# Patient Record
Sex: FEMALE | Race: BLACK OR AFRICAN AMERICAN | URBAN - METROPOLITAN AREA
[De-identification: names, ages, dates, MRNs, and addresses within clinical notes are randomized per-mention and may not be internally consistent; named-entity substitution may affect disease eponyms.]

---

## 2023-08-16 ENCOUNTER — OFFICE VISIT (OUTPATIENT)
Age: 53
End: 2023-08-16

## 2023-08-16 VITALS — HEIGHT: 68 IN | BODY MASS INDEX: 32.58 KG/M2 | WEIGHT: 215 LBS

## 2023-08-16 DIAGNOSIS — M77.11 LATERAL EPICONDYLITIS OF RIGHT ELBOW: Primary | ICD-10-CM

## 2023-08-16 RX ORDER — TRIAMCINOLONE ACETONIDE 40 MG/ML
40 INJECTION, SUSPENSION INTRA-ARTICULAR; INTRAMUSCULAR ONCE
Status: COMPLETED | OUTPATIENT
Start: 2023-08-16 | End: 2023-08-16

## 2023-08-16 RX ORDER — FERROUS SULFATE 325(65) MG
325 TABLET ORAL
COMMUNITY

## 2023-08-16 RX ADMIN — TRIAMCINOLONE ACETONIDE 40 MG: 40 INJECTION, SUSPENSION INTRA-ARTICULAR; INTRAMUSCULAR at 11:07

## 2024-05-29 ENCOUNTER — TELEPHONE (OUTPATIENT)
Age: 54
End: 2024-05-29

## 2024-05-29 ENCOUNTER — OFFICE VISIT (OUTPATIENT)
Age: 54
End: 2024-05-29
Payer: MEDICAID

## 2024-05-29 VITALS — HEIGHT: 68 IN | BODY MASS INDEX: 31.83 KG/M2 | WEIGHT: 210 LBS

## 2024-05-29 DIAGNOSIS — M77.11 LATERAL EPICONDYLITIS OF RIGHT ELBOW: Primary | ICD-10-CM

## 2024-05-29 PROCEDURE — 20551 NJX 1 TENDON ORIGIN/INSJ: CPT | Performed by: ORTHOPAEDIC SURGERY

## 2024-05-29 PROCEDURE — 99213 OFFICE O/P EST LOW 20 MIN: CPT | Performed by: ORTHOPAEDIC SURGERY

## 2024-05-29 RX ORDER — LIDOCAINE HYDROCHLORIDE 10 MG/ML
1 INJECTION, SOLUTION INFILTRATION; PERINEURAL ONCE
Status: COMPLETED | OUTPATIENT
Start: 2024-05-29 | End: 2024-05-29

## 2024-05-29 RX ORDER — TRIAMCINOLONE ACETONIDE 40 MG/ML
40 INJECTION, SUSPENSION INTRA-ARTICULAR; INTRAMUSCULAR ONCE
Status: COMPLETED | OUTPATIENT
Start: 2024-05-29 | End: 2024-05-29

## 2024-05-29 RX ADMIN — LIDOCAINE HYDROCHLORIDE 1 ML: 10 INJECTION, SOLUTION INFILTRATION; PERINEURAL at 10:53

## 2024-05-29 RX ADMIN — TRIAMCINOLONE ACETONIDE 40 MG: 40 INJECTION, SUSPENSION INTRA-ARTICULAR; INTRAMUSCULAR at 10:53

## 2024-05-29 NOTE — TELEPHONE ENCOUNTER
Patient is requesting a call back because she wants to know if she can take ibuprofen or use ice pack to help control the pain, patient appt was 5/29/24.    Patient also wants to know how long does it take before the medication will start working to help her pain.    Patient tel [phone]

## 2024-05-29 NOTE — PROGRESS NOTES
signed and verified    Time: 10:58 AM    Location: Right elbow lateral epicondyle injection    Kenalog 40mg & 1cc Lidocaine    Date of procedure: 5/29/2024    Procedure performed by:  Varun Butler MD    Provider assisted by: Office     Patient assisted by: self    How tolerated by patient: tolerated the procedure well with no complications    Post Procedural Pain Scale: 0 - No Hurt    Comments: none      Electronically signed by: Varun Butler MD     Note: This note was completed using voice recognition software.  Any typographical/name errors or mistakes are unintentional.